# Patient Record
Sex: FEMALE | Race: WHITE | NOT HISPANIC OR LATINO | Employment: OTHER | URBAN - METROPOLITAN AREA
[De-identification: names, ages, dates, MRNs, and addresses within clinical notes are randomized per-mention and may not be internally consistent; named-entity substitution may affect disease eponyms.]

---

## 2020-09-18 ENCOUNTER — OFFICE VISIT (OUTPATIENT)
Dept: NEUROSURGERY | Facility: CLINIC | Age: 70
End: 2020-09-18

## 2020-09-18 ENCOUNTER — PREP FOR SURGERY (OUTPATIENT)
Dept: OTHER | Facility: HOSPITAL | Age: 70
End: 2020-09-18

## 2020-09-18 VITALS — BODY MASS INDEX: 24.99 KG/M2 | HEIGHT: 65 IN | TEMPERATURE: 97.5 F | WEIGHT: 150 LBS

## 2020-09-18 DIAGNOSIS — G89.29 CHRONIC BILATERAL LOW BACK PAIN WITH LEFT-SIDED SCIATICA: Primary | ICD-10-CM

## 2020-09-18 DIAGNOSIS — M51.26 LUMBAR DISC HERNIATION: Primary | ICD-10-CM

## 2020-09-18 DIAGNOSIS — M54.42 CHRONIC BILATERAL LOW BACK PAIN WITH LEFT-SIDED SCIATICA: Primary | ICD-10-CM

## 2020-09-18 DIAGNOSIS — M51.26 LUMBAR DISC HERNIATION: ICD-10-CM

## 2020-09-18 PROCEDURE — 99203 OFFICE O/P NEW LOW 30 MIN: CPT | Performed by: NEUROLOGICAL SURGERY

## 2020-09-18 RX ORDER — FAMOTIDINE 20 MG/1
20 TABLET, FILM COATED ORAL
Status: CANCELLED | OUTPATIENT
Start: 2020-09-18

## 2020-09-18 RX ORDER — ACETAMINOPHEN AND CODEINE PHOSPHATE 300; 30 MG/1; MG/1
TABLET ORAL
COMMUNITY
Start: 2020-09-11 | End: 2020-10-05

## 2020-09-18 RX ORDER — HYDROCHLOROTHIAZIDE 12.5 MG/1
12.5 TABLET ORAL DAILY
COMMUNITY
Start: 2020-08-06

## 2020-09-18 RX ORDER — GABAPENTIN 600 MG/1
600 TABLET ORAL
COMMUNITY
Start: 2020-08-28 | End: 2020-12-07

## 2020-09-18 RX ORDER — CEFAZOLIN SODIUM 2 G/100ML
2 INJECTION, SOLUTION INTRAVENOUS ONCE
Status: CANCELLED | OUTPATIENT
Start: 2020-09-18 | End: 2020-09-18

## 2020-09-18 RX ORDER — SODIUM CHLORIDE, SODIUM LACTATE, POTASSIUM CHLORIDE, CALCIUM CHLORIDE 600; 310; 30; 20 MG/100ML; MG/100ML; MG/100ML; MG/100ML
9 INJECTION, SOLUTION INTRAVENOUS CONTINUOUS
Status: CANCELLED | OUTPATIENT
Start: 2020-09-18

## 2020-09-18 RX ORDER — LOSARTAN POTASSIUM 50 MG/1
50 TABLET ORAL 2 TIMES DAILY
COMMUNITY
Start: 2020-08-06

## 2020-09-18 RX ORDER — IBUPROFEN 800 MG/1
800 TABLET ORAL ONCE
Status: CANCELLED | OUTPATIENT
Start: 2020-09-18 | End: 2020-09-18

## 2020-09-18 RX ORDER — ACETAMINOPHEN 325 MG/1
650 TABLET ORAL ONCE
Status: CANCELLED | OUTPATIENT
Start: 2020-09-18 | End: 2020-09-18

## 2020-09-18 RX ORDER — CHLORHEXIDINE GLUCONATE 4 G/100ML
SOLUTION TOPICAL
Qty: 120 ML | Refills: 0 | OUTPATIENT
Start: 2020-09-18 | End: 2020-12-07

## 2020-09-18 RX ORDER — HYDROCODONE BITARTRATE AND ACETAMINOPHEN 7.5; 325 MG/1; MG/1
1 TABLET ORAL ONCE
Status: CANCELLED | OUTPATIENT
Start: 2020-09-18 | End: 2020-09-18

## 2020-09-18 NOTE — PROGRESS NOTES
NAME: JOHANN ALLEN   DOS: 2020  : 1950  PCP: Cortney Long MD    Chief Complaint:    Chief Complaint   Patient presents with   • Back Pain   • Leg Pain       History of Present Illness:  70 y.o. female   Is a 70-year-old female who is from [place] who owns an equine business and is very healthy and active woman she is accompanied by her  who Dr. Slaughter had done spinal fusion surgery on    She presents with an insidious onset of about 6 to 8 weeks of buttock hip and left leg pain seems reminiscent of polyradiculopathy perhaps L3 or L4 she denies preexistent symptoms of neurogenic claudication she has had 2 lumbar epidural steroid blocks with minimal relief and is had a market reduction in her quality life she denies bowel bladder continence issues she does have occasional arthritic symptoms in her hip she is here for evaluation    PMHX  Allergies:  Allergies   Allergen Reactions   • Sulfa Antibiotics Unknown - Low Severity     Medications    Current Outpatient Medications:   •  acetaminophen-codeine (TYLENOL #3) 300-30 MG per tablet, TK 1 T PO Q 6 H PRN FOR PAIN FOLLOWING PROCEDURE, Disp: , Rfl:   •  gabapentin (NEURONTIN) 600 MG tablet, TK 1 T PO Q 4 H, Disp: , Rfl:   •  hydroCHLOROthiazide (HYDRODIURIL) 12.5 MG tablet, Take 12.5 mg by mouth Daily., Disp: , Rfl:   •  losartan (COZAAR) 50 MG tablet, Take 50 mg by mouth 2 (Two) Times a Day., Disp: , Rfl:   Past Medical History:  Past Medical History:   Diagnosis Date   • Hypertension      Past Surgical History:  Past Surgical History:   Procedure Laterality Date   • HYSTERECTOMY       Social Hx:  Social History     Tobacco Use   • Smoking status: Never Smoker   • Smokeless tobacco: Never Used   Substance Use Topics   • Alcohol use: Never     Frequency: Never   • Drug use: Never     Family Hx:  Family History   Problem Relation Age of Onset   • Heart disease Mother    • Stroke Father      Review of Systems:        Review of Systems    Musculoskeletal: Positive for back pain.   All other systems reviewed and are negative.  I have reviewed this note template and all pertinent parts of the review of systems social, family history, surgical history and medication list        Physical Examination:  Vitals:    09/18/20 1610   Temp: 97.5 °F (36.4 °C)      General Appearance:   Well developed, well nourished, well groomed, alert, and cooperative.  Neurological examination:  Neurologic Exam  Vital signs were reviewed and documented in the chart  Patient appeared in good neurologic function with normal comprehension fluent speech  Mood and affect are normal  Sense of smell deferred    Pupils symmetric equally reactive funduscopic exam not visualized   Visual fields intact to confrontation  Extraocular movements intact  Face motor function is symmetric  Facial sensations normal  Hearing intact to finger rub hearing intact to finger rub  Tongue is midline  Palate symmetric  Swallowing normal  Shoulder shrug normal    Muscle bulk and tone normal  5 out of 5 strength no motor drift  Gait normal intact antalgic gait  Negative Romberg  No clonus long tract signs or myelopathy    Reflexes symmetric trace throughout if not absent at the knees bilaterally I could elicit a right but not a left knee reflex  No edema noted and extremities skin appears normal    Straight leg raise sign absent on the right positive on the left  No signs of intrinsic hip dysfunction bilateral subtle  Back is without any lesions or abnormality  Feet are warm and well perfused        Review of Imaging/DATA:  I reviewed a MRI of her lumbar spine it shows pretty impressive lumbar degenerative disc disease at L4-5 L3-4 and multiple other levels she does have an intraforaminal left L3-4 disc herniation  Diagnoses/Plan:    Ms. Pedraza is a 70 y.o. female   Left-sided subacute L4 radiculopathy inferiorly extruded L3-4 disc multilevel degenerative disc disease talked with her about the  complexities of her lumbar spinal problem but more importantly about the long-term outcome regarding surgical and nonsurgical management I explained that there are equivalent strategies and most studies over the first 2 to 5 years however surgery expedite early recovery with no change in long-term outcome I explained the risk benefits of surgical management I think is reasonable she has a decreased left knee reflex and what her  discusses as intractable pain in the left hip and L4 distribution I showed her films and explained what surgical intervention looks like as well as the complication rate    Plan for left-sided L3-4 disc herniation removal

## 2020-09-21 PROBLEM — M51.26 LUMBAR DISC HERNIATION: Status: ACTIVE | Noted: 2020-09-21

## 2020-09-25 ENCOUNTER — APPOINTMENT (OUTPATIENT)
Dept: PREADMISSION TESTING | Facility: HOSPITAL | Age: 70
End: 2020-09-25

## 2020-09-27 ENCOUNTER — APPOINTMENT (OUTPATIENT)
Dept: PREADMISSION TESTING | Facility: HOSPITAL | Age: 70
End: 2020-09-27

## 2020-10-05 ENCOUNTER — PRE-ADMISSION TESTING (OUTPATIENT)
Dept: PREADMISSION TESTING | Facility: HOSPITAL | Age: 70
End: 2020-10-05

## 2020-10-05 ENCOUNTER — APPOINTMENT (OUTPATIENT)
Dept: PREADMISSION TESTING | Facility: HOSPITAL | Age: 70
End: 2020-10-05

## 2020-10-05 VITALS — BODY MASS INDEX: 24.99 KG/M2 | WEIGHT: 150 LBS | HEIGHT: 65 IN

## 2020-10-05 DIAGNOSIS — M51.26 LUMBAR DISC HERNIATION: ICD-10-CM

## 2020-10-05 LAB
ANION GAP SERPL CALCULATED.3IONS-SCNC: 9 MMOL/L (ref 5–15)
BUN SERPL-MCNC: 14 MG/DL (ref 8–23)
BUN/CREAT SERPL: 24.1 (ref 7–25)
CALCIUM SPEC-SCNC: 9.8 MG/DL (ref 8.6–10.5)
CHLORIDE SERPL-SCNC: 95 MMOL/L (ref 98–107)
CO2 SERPL-SCNC: 28 MMOL/L (ref 22–29)
CREAT SERPL-MCNC: 0.58 MG/DL (ref 0.57–1)
DEPRECATED RDW RBC AUTO: 39.4 FL (ref 37–54)
ERYTHROCYTE [DISTWIDTH] IN BLOOD BY AUTOMATED COUNT: 11.6 % (ref 12.3–15.4)
GFR SERPL CREATININE-BSD FRML MDRD: 102 ML/MIN/1.73
GLUCOSE SERPL-MCNC: 127 MG/DL (ref 65–99)
HCT VFR BLD AUTO: 41.1 % (ref 34–46.6)
HGB BLD-MCNC: 13.6 G/DL (ref 12–15.9)
MCH RBC QN AUTO: 30.7 PG (ref 26.6–33)
MCHC RBC AUTO-ENTMCNC: 33.1 G/DL (ref 31.5–35.7)
MCV RBC AUTO: 92.8 FL (ref 79–97)
MRSA DNA SPEC QL NAA+PROBE: NEGATIVE
PLATELET # BLD AUTO: 291 10*3/MM3 (ref 140–450)
PMV BLD AUTO: 9.5 FL (ref 6–12)
POTASSIUM SERPL-SCNC: 4.2 MMOL/L (ref 3.5–5.2)
RBC # BLD AUTO: 4.43 10*6/MM3 (ref 3.77–5.28)
SODIUM SERPL-SCNC: 132 MMOL/L (ref 136–145)
WBC # BLD AUTO: 6.27 10*3/MM3 (ref 3.4–10.8)

## 2020-10-05 PROCEDURE — 85027 COMPLETE CBC AUTOMATED: CPT | Performed by: NEUROLOGICAL SURGERY

## 2020-10-05 PROCEDURE — U0004 COV-19 TEST NON-CDC HGH THRU: HCPCS

## 2020-10-05 PROCEDURE — 87641 MR-STAPH DNA AMP PROBE: CPT | Performed by: NEUROLOGICAL SURGERY

## 2020-10-05 PROCEDURE — 80048 BASIC METABOLIC PNL TOTAL CA: CPT | Performed by: NEUROLOGICAL SURGERY

## 2020-10-05 PROCEDURE — 93005 ELECTROCARDIOGRAM TRACING: CPT

## 2020-10-05 PROCEDURE — C9803 HOPD COVID-19 SPEC COLLECT: HCPCS

## 2020-10-05 PROCEDURE — 93010 ELECTROCARDIOGRAM REPORT: CPT | Performed by: INTERNAL MEDICINE

## 2020-10-05 PROCEDURE — 36415 COLL VENOUS BLD VENIPUNCTURE: CPT

## 2020-10-05 RX ORDER — DIPHENHYDRAMINE HCL 50 MG
25 CAPSULE ORAL NIGHTLY PRN
COMMUNITY

## 2020-10-05 RX ORDER — BISACODYL 5 MG/1
5 TABLET, DELAYED RELEASE ORAL NIGHTLY PRN
COMMUNITY
End: 2020-12-07

## 2020-10-05 RX ORDER — ASPIRIN 325 MG
325 TABLET ORAL DAILY
COMMUNITY
End: 2020-12-07

## 2020-10-05 RX ORDER — DOCUSATE SODIUM 250 MG
500 CAPSULE ORAL DAILY
COMMUNITY

## 2020-10-05 RX ORDER — ACETAMINOPHEN AND CODEINE PHOSPHATE 60; 300 MG/1; MG/1
1 TABLET ORAL EVERY 4 HOURS PRN
COMMUNITY
End: 2020-12-07

## 2020-10-06 LAB — SARS-COV-2 RNA RESP QL NAA+PROBE: NOT DETECTED

## 2020-10-07 ENCOUNTER — APPOINTMENT (OUTPATIENT)
Dept: GENERAL RADIOLOGY | Facility: HOSPITAL | Age: 70
End: 2020-10-07

## 2020-10-07 ENCOUNTER — ANESTHESIA EVENT (OUTPATIENT)
Dept: PERIOP | Facility: HOSPITAL | Age: 70
End: 2020-10-07

## 2020-10-07 ENCOUNTER — ANESTHESIA (OUTPATIENT)
Dept: PERIOP | Facility: HOSPITAL | Age: 70
End: 2020-10-07

## 2020-10-07 ENCOUNTER — HOSPITAL ENCOUNTER (OUTPATIENT)
Facility: HOSPITAL | Age: 70
Setting detail: HOSPITAL OUTPATIENT SURGERY
Discharge: HOME | End: 2020-10-07
Attending: NEUROLOGICAL SURGERY | Admitting: NEUROLOGICAL SURGERY

## 2020-10-07 VITALS
HEART RATE: 92 BPM | OXYGEN SATURATION: 100 % | HEIGHT: 65 IN | WEIGHT: 150 LBS | TEMPERATURE: 97.4 F | DIASTOLIC BLOOD PRESSURE: 89 MMHG | SYSTOLIC BLOOD PRESSURE: 152 MMHG | BODY MASS INDEX: 24.99 KG/M2 | RESPIRATION RATE: 16 BRPM

## 2020-10-07 DIAGNOSIS — M51.26 LUMBAR DISC HERNIATION: ICD-10-CM

## 2020-10-07 PROCEDURE — 25010000002 FENTANYL CITRATE (PF) 100 MCG/2ML SOLUTION: Performed by: NURSE ANESTHETIST, CERTIFIED REGISTERED

## 2020-10-07 PROCEDURE — 25010000002 NEOSTIGMINE 10 MG/10ML SOLUTION: Performed by: NURSE ANESTHETIST, CERTIFIED REGISTERED

## 2020-10-07 PROCEDURE — 63030 LAMOT DCMPRN NRV RT 1 LMBR: CPT | Performed by: PHYSICIAN ASSISTANT

## 2020-10-07 PROCEDURE — 25010000002 PROPOFOL 10 MG/ML EMULSION: Performed by: NURSE ANESTHETIST, CERTIFIED REGISTERED

## 2020-10-07 PROCEDURE — 25010000002 ONDANSETRON PER 1 MG: Performed by: NURSE ANESTHETIST, CERTIFIED REGISTERED

## 2020-10-07 PROCEDURE — 25010000002 DEXAMETHASONE PER 1 MG: Performed by: NURSE ANESTHETIST, CERTIFIED REGISTERED

## 2020-10-07 PROCEDURE — 76000 FLUOROSCOPY <1 HR PHYS/QHP: CPT

## 2020-10-07 PROCEDURE — 63030 LAMOT DCMPRN NRV RT 1 LMBR: CPT | Performed by: NEUROLOGICAL SURGERY

## 2020-10-07 PROCEDURE — 25010000003 CEFAZOLIN IN DEXTROSE 2-4 GM/100ML-% SOLUTION: Performed by: NEUROLOGICAL SURGERY

## 2020-10-07 DEVICE — HEMOST ABS SURGIFOAM SZ100 8X12 10MM: Type: IMPLANTABLE DEVICE | Site: SPINE LUMBAR | Status: FUNCTIONAL

## 2020-10-07 DEVICE — FLOSEAL HEMOSTATIC MATRIX, 10ML
Type: IMPLANTABLE DEVICE | Site: SPINE LUMBAR | Status: FUNCTIONAL
Brand: FLOSEAL HEMOSTATIC MATRIX

## 2020-10-07 RX ORDER — SODIUM CHLORIDE, SODIUM LACTATE, POTASSIUM CHLORIDE, CALCIUM CHLORIDE 600; 310; 30; 20 MG/100ML; MG/100ML; MG/100ML; MG/100ML
9 INJECTION, SOLUTION INTRAVENOUS CONTINUOUS
Status: DISCONTINUED | OUTPATIENT
Start: 2020-10-07 | End: 2020-10-07 | Stop reason: HOSPADM

## 2020-10-07 RX ORDER — LIDOCAINE HYDROCHLORIDE 10 MG/ML
0.5 INJECTION, SOLUTION EPIDURAL; INFILTRATION; INTRACAUDAL; PERINEURAL ONCE AS NEEDED
Status: DISCONTINUED | OUTPATIENT
Start: 2020-10-07 | End: 2020-10-07 | Stop reason: HOSPADM

## 2020-10-07 RX ORDER — NEOSTIGMINE METHYLSULFATE 1 MG/ML
INJECTION, SOLUTION INTRAVENOUS AS NEEDED
Status: DISCONTINUED | OUTPATIENT
Start: 2020-10-07 | End: 2020-10-07 | Stop reason: SURG

## 2020-10-07 RX ORDER — FENTANYL CITRATE 50 UG/ML
50 INJECTION, SOLUTION INTRAMUSCULAR; INTRAVENOUS
Status: DISCONTINUED | OUTPATIENT
Start: 2020-10-07 | End: 2020-10-07 | Stop reason: HOSPADM

## 2020-10-07 RX ORDER — HYDROMORPHONE HYDROCHLORIDE 1 MG/ML
0.5 INJECTION, SOLUTION INTRAMUSCULAR; INTRAVENOUS; SUBCUTANEOUS
Status: DISCONTINUED | OUTPATIENT
Start: 2020-10-07 | End: 2020-10-07 | Stop reason: HOSPADM

## 2020-10-07 RX ORDER — FAMOTIDINE 20 MG/1
20 TABLET, FILM COATED ORAL ONCE
Status: CANCELLED | OUTPATIENT
Start: 2020-10-07 | End: 2020-10-07

## 2020-10-07 RX ORDER — ACETAMINOPHEN 325 MG/1
650 TABLET ORAL ONCE
Status: COMPLETED | OUTPATIENT
Start: 2020-10-07 | End: 2020-10-07

## 2020-10-07 RX ORDER — GLYCOPYRROLATE 0.2 MG/ML
INJECTION INTRAMUSCULAR; INTRAVENOUS AS NEEDED
Status: DISCONTINUED | OUTPATIENT
Start: 2020-10-07 | End: 2020-10-07 | Stop reason: SURG

## 2020-10-07 RX ORDER — HYDROCODONE BITARTRATE AND ACETAMINOPHEN 5; 325 MG/1; MG/1
1-2 TABLET ORAL EVERY 4 HOURS PRN
Qty: 30 TABLET | Refills: 0 | Status: SHIPPED | OUTPATIENT
Start: 2020-10-07 | End: 2020-10-26

## 2020-10-07 RX ORDER — DEXAMETHASONE SODIUM PHOSPHATE 4 MG/ML
INJECTION, SOLUTION INTRA-ARTICULAR; INTRALESIONAL; INTRAMUSCULAR; INTRAVENOUS; SOFT TISSUE AS NEEDED
Status: DISCONTINUED | OUTPATIENT
Start: 2020-10-07 | End: 2020-10-07 | Stop reason: SURG

## 2020-10-07 RX ORDER — HYDROCODONE BITARTRATE AND ACETAMINOPHEN 7.5; 325 MG/1; MG/1
1 TABLET ORAL ONCE
Status: COMPLETED | OUTPATIENT
Start: 2020-10-07 | End: 2020-10-07

## 2020-10-07 RX ORDER — ONDANSETRON 2 MG/ML
4 INJECTION INTRAMUSCULAR; INTRAVENOUS ONCE AS NEEDED
Status: DISCONTINUED | OUTPATIENT
Start: 2020-10-07 | End: 2020-10-07 | Stop reason: HOSPADM

## 2020-10-07 RX ORDER — FAMOTIDINE 20 MG/1
20 TABLET, FILM COATED ORAL
Status: COMPLETED | OUTPATIENT
Start: 2020-10-07 | End: 2020-10-07

## 2020-10-07 RX ORDER — ONDANSETRON 2 MG/ML
INJECTION INTRAMUSCULAR; INTRAVENOUS AS NEEDED
Status: DISCONTINUED | OUTPATIENT
Start: 2020-10-07 | End: 2020-10-07 | Stop reason: SURG

## 2020-10-07 RX ORDER — ROCURONIUM BROMIDE 10 MG/ML
INJECTION, SOLUTION INTRAVENOUS AS NEEDED
Status: DISCONTINUED | OUTPATIENT
Start: 2020-10-07 | End: 2020-10-07 | Stop reason: SURG

## 2020-10-07 RX ORDER — LIDOCAINE HYDROCHLORIDE 10 MG/ML
INJECTION, SOLUTION EPIDURAL; INFILTRATION; INTRACAUDAL; PERINEURAL AS NEEDED
Status: DISCONTINUED | OUTPATIENT
Start: 2020-10-07 | End: 2020-10-07 | Stop reason: SURG

## 2020-10-07 RX ORDER — SODIUM CHLORIDE 0.9 % (FLUSH) 0.9 %
10 SYRINGE (ML) INJECTION AS NEEDED
Status: DISCONTINUED | OUTPATIENT
Start: 2020-10-07 | End: 2020-10-07 | Stop reason: HOSPADM

## 2020-10-07 RX ORDER — HYDROCODONE BITARTRATE AND ACETAMINOPHEN 5; 325 MG/1; MG/1
1 TABLET ORAL ONCE AS NEEDED
Status: CANCELLED | OUTPATIENT
Start: 2020-10-07 | End: 2020-10-17

## 2020-10-07 RX ORDER — CEFAZOLIN SODIUM 2 G/100ML
2 INJECTION, SOLUTION INTRAVENOUS ONCE
Status: COMPLETED | OUTPATIENT
Start: 2020-10-07 | End: 2020-10-07

## 2020-10-07 RX ORDER — PROPOFOL 10 MG/ML
VIAL (ML) INTRAVENOUS AS NEEDED
Status: DISCONTINUED | OUTPATIENT
Start: 2020-10-07 | End: 2020-10-07 | Stop reason: SURG

## 2020-10-07 RX ORDER — BUPIVACAINE HYDROCHLORIDE 2.5 MG/ML
INJECTION, SOLUTION EPIDURAL; INFILTRATION; INTRACAUDAL AS NEEDED
Status: DISCONTINUED | OUTPATIENT
Start: 2020-10-07 | End: 2020-10-07 | Stop reason: HOSPADM

## 2020-10-07 RX ORDER — LIDOCAINE HYDROCHLORIDE AND EPINEPHRINE 5; 5 MG/ML; UG/ML
INJECTION, SOLUTION INFILTRATION; PERINEURAL AS NEEDED
Status: DISCONTINUED | OUTPATIENT
Start: 2020-10-07 | End: 2020-10-07 | Stop reason: HOSPADM

## 2020-10-07 RX ORDER — FAMOTIDINE 10 MG/ML
20 INJECTION, SOLUTION INTRAVENOUS ONCE
Status: CANCELLED | OUTPATIENT
Start: 2020-10-07 | End: 2020-10-07

## 2020-10-07 RX ORDER — MAGNESIUM HYDROXIDE 1200 MG/15ML
LIQUID ORAL AS NEEDED
Status: DISCONTINUED | OUTPATIENT
Start: 2020-10-07 | End: 2020-10-07 | Stop reason: HOSPADM

## 2020-10-07 RX ORDER — IBUPROFEN 800 MG/1
800 TABLET ORAL ONCE
Status: COMPLETED | OUTPATIENT
Start: 2020-10-07 | End: 2020-10-07

## 2020-10-07 RX ORDER — SODIUM CHLORIDE 0.9 % (FLUSH) 0.9 %
10 SYRINGE (ML) INJECTION EVERY 12 HOURS SCHEDULED
Status: DISCONTINUED | OUTPATIENT
Start: 2020-10-07 | End: 2020-10-07 | Stop reason: HOSPADM

## 2020-10-07 RX ADMIN — FAMOTIDINE 20 MG: 20 TABLET ORAL at 08:10

## 2020-10-07 RX ADMIN — CEFAZOLIN SODIUM 2 G: 2 INJECTION, SOLUTION INTRAVENOUS at 09:35

## 2020-10-07 RX ADMIN — SODIUM CHLORIDE, POTASSIUM CHLORIDE, SODIUM LACTATE AND CALCIUM CHLORIDE: 600; 310; 30; 20 INJECTION, SOLUTION INTRAVENOUS at 10:01

## 2020-10-07 RX ADMIN — ONDANSETRON 4 MG: 2 INJECTION INTRAMUSCULAR; INTRAVENOUS at 10:44

## 2020-10-07 RX ADMIN — ROCURONIUM BROMIDE 40 MG: 10 INJECTION INTRAVENOUS at 09:26

## 2020-10-07 RX ADMIN — ACETAMINOPHEN 650 MG: 500 TABLET ORAL at 08:10

## 2020-10-07 RX ADMIN — SODIUM CHLORIDE, POTASSIUM CHLORIDE, SODIUM LACTATE AND CALCIUM CHLORIDE 9 ML/HR: 600; 310; 30; 20 INJECTION, SOLUTION INTRAVENOUS at 08:10

## 2020-10-07 RX ADMIN — NEOSTIGMINE 3 MG: 1 INJECTION INTRAVENOUS at 10:44

## 2020-10-07 RX ADMIN — HYDROCODONE BITARTRATE AND ACETAMINOPHEN 1 TABLET: 7.5; 325 TABLET ORAL at 08:11

## 2020-10-07 RX ADMIN — IBUPROFEN 800 MG: 800 TABLET, FILM COATED ORAL at 08:11

## 2020-10-07 RX ADMIN — EPHEDRINE SULFATE 10 MG: 50 INJECTION INTRAMUSCULAR; INTRAVENOUS; SUBCUTANEOUS at 10:36

## 2020-10-07 RX ADMIN — DEXAMETHASONE SODIUM PHOSPHATE 8 MG: 4 INJECTION, SOLUTION INTRAMUSCULAR; INTRAVENOUS at 09:35

## 2020-10-07 RX ADMIN — LIDOCAINE HYDROCHLORIDE 50 MG: 10 INJECTION, SOLUTION EPIDURAL; INFILTRATION; INTRACAUDAL; PERINEURAL at 09:26

## 2020-10-07 RX ADMIN — FENTANYL CITRATE 50 MCG: 0.05 INJECTION, SOLUTION INTRAMUSCULAR; INTRAVENOUS at 12:08

## 2020-10-07 RX ADMIN — EPHEDRINE SULFATE 15 MG: 50 INJECTION INTRAMUSCULAR; INTRAVENOUS; SUBCUTANEOUS at 09:40

## 2020-10-07 RX ADMIN — PROPOFOL 150 MG: 10 INJECTION, EMULSION INTRAVENOUS at 09:26

## 2020-10-07 RX ADMIN — GLYCOPYRROLATE 0.4 MG: 0.2 INJECTION INTRAMUSCULAR; INTRAVENOUS at 10:44

## 2020-10-07 RX ADMIN — EPHEDRINE SULFATE 5 MG: 50 INJECTION INTRAMUSCULAR; INTRAVENOUS; SUBCUTANEOUS at 09:58

## 2020-10-07 NOTE — ANESTHESIA PREPROCEDURE EVALUATION
Anesthesia Evaluation                  Airway   Mallampati: I  TM distance: >3 FB  Neck ROM: full  No difficulty expected  Dental - normal exam     Pulmonary - normal exam   Cardiovascular - normal exam    (+) hypertension,       Neuro/Psych  GI/Hepatic/Renal/Endo      Musculoskeletal     (+) back pain,   Abdominal  - normal exam    Bowel sounds: normal.   Substance History      OB/GYN          Other                        Anesthesia Plan    ASA 2     general     intravenous induction     Anesthetic plan, all risks, benefits, and alternatives have been provided, discussed and informed consent has been obtained with: patient.    Plan discussed with CRNA.

## 2020-10-07 NOTE — ANESTHESIA PROCEDURE NOTES
Airway  Urgency: elective    Date/Time: 10/7/2020 9:30 AM  Airway not difficult    General Information and Staff    Patient location during procedure: OR  CRNA: Adrian Castro CRNA    Indications and Patient Condition  Indications for airway management: airway protection    Preoxygenated: yes  MILS not maintained throughout  Mask difficulty assessment: 2 - vent by mask + OA or adjuvant +/- NMBA    Final Airway Details  Final airway type: endotracheal airway      Successful airway: ETT  Cuffed: yes   Successful intubation technique: direct laryngoscopy  Endotracheal tube insertion site: oral  Blade: Holman  Blade size: 3  ETT size (mm): 7.0  Cormack-Lehane Classification: grade IIa - partial view of glottis  Placement verified by: chest auscultation and capnometry   Cuff volume (mL): 5  Measured from: lips  ETT/EBT  to lips (cm): 21  Number of attempts at approach: 2  Assessment: lips, teeth, and gum same as pre-op and atraumatic intubation    Additional Comments  Negative epigastric sounds, Breath sound equal bilaterally with symmetric chest rise and fall

## 2020-10-07 NOTE — INTERVAL H&P NOTE
"Pre-Op H&P (See Recent Office Note Attached for Full H&P)    Chief complaint: Left buttock, hip, and leg pain    HPI:      Patient is a 70 y.o. female who presents with an insidious onset of about 6 to 8 weeks of buttock hip and left leg pain seemingly reminiscent of polyradiculopathy perhaps L3 or L4. She denies pre-existent symptoms of neurogenic claudication. She has had 2 lumbar epidural steroid blocks with minimal relief. She has had a marked reduction in her quality life. She denies bowel/bladder continence issues. She does have occasional arthritic symptoms in her hip. MRI of her lumbar spine shows pretty impressive lumbar degenerative disc disease at L4-5 L3-4 and multiple other levels. She does have an intraforaminal left L3-4 disc herniation. Conservative treatment has failed to provide significant relief. Surgical intervention is recommended and she is agreeable. She is here today for a left lumbar microdiscectomy L3-L4.    Review of Systems:  General ROS:  no fever, chills, rashes.  No change since last office visit.  No recent sick exposure  Cardiovascular ROS: no chest pain or dyspnea on exertion; +HTN  Respiratory ROS: no cough, shortness of breath, or wheezing      Meds:    No current facility-administered medications on file prior to encounter.      Current Outpatient Medications on File Prior to Encounter   Medication Sig Dispense Refill   • chlorhexidine (HIBICLENS) 4 % external liquid Shower each day with solution for 5 days beginning 5 days before surgery. 120 mL 0   • gabapentin (NEURONTIN) 600 MG tablet Take 600 mg by mouth Every 4 (Four) Hours.     • hydroCHLOROthiazide (HYDRODIURIL) 12.5 MG tablet Take 12.5 mg by mouth Daily.     • losartan (COZAAR) 50 MG tablet Take 50 mg by mouth 2 (Two) Times a Day.         Vital Signs:  /84 (BP Location: Right arm, Patient Position: Lying)   Pulse 84   Temp 98 °F (36.7 °C) (Temporal)   Resp 18   Ht 165.1 cm (65\")   Wt 68 kg (150 lb)   SpO2 " 93%   BMI 24.96 kg/m²     Physical Exam:    CV:  S1S2 regular rate and rhythm, no murmur               Resp:  Clear to auscultation; respirations regular, even and unlabored    Results Review:     Lab Results   Component Value Date    WBC 6.27 10/05/2020    HGB 13.6 10/05/2020    HCT 41.1 10/05/2020    MCV 92.8 10/05/2020     10/05/2020    GLUCOSE 127 (H) 10/05/2020    BUN 14 10/05/2020    CREATININE 0.58 10/05/2020    EGFRIFNONA 102 10/05/2020     (L) 10/05/2020    K 4.2 10/05/2020    CL 95 (L) 10/05/2020    CO2 28.0 10/05/2020    CALCIUM 9.8 10/05/2020        I reviewed the patient's new clinical results.     10/5/20 COVID-19: negative   10/5/20 ECG: reviewed, NSR    Cancer Staging (if applicable)  Cancer Patient: __ yes _X_no __unknown; If yes, clinical stage T:__ N:__M:__, stage group or __N/A    Assessment:   1. Left-sided, subacute L4 radiculopathy   2. Inferiorly extruded L3-L4 disc   3. Multilevel degenerative disc disease   4. Anesthesia aware of elevated BP. Will continue to monitor closely.    Plan: Left lumbar microdiscectomy L3-L4      Loraine Caballero, APRN  10/7/2020   08:07 EDT

## 2020-10-07 NOTE — ANESTHESIA POSTPROCEDURE EVALUATION
Patient: Shweta Pedraza    Procedure Summary     Date: 10/07/20 Room / Location:  [facility]  [facility]    Anesthesia Start: 0921 Anesthesia Stop:     Procedure: lumbar MICROdiscectomy left L3-4 (Left Spine Lumbar) Diagnosis:       Lumbar disc herniation      (Lumbar disc herniation [M51.26])    Surgeon: Fortino Abraham MD Provider: Rudi Lr MD    Anesthesia Type: general ASA Status: 2          Anesthesia Type: general    Vitals  No vitals data found for the desired time range.          Post Anesthesia Care and Evaluation    Patient location during evaluation: PACU  Patient participation: complete - patient participated  Level of consciousness: awake  Pain score: 0  Pain management: adequate  Airway patency: patent  Anesthetic complications: No anesthetic complications  PONV Status: none  Cardiovascular status: acceptable and stable  Respiratory status: nasal cannula, unassisted, acceptable and spontaneous ventilation  Hydration status: acceptable

## 2020-10-07 NOTE — OP NOTE
Operation note      Preoperative diagnosis left-sided central and left inferiorly extruded L3-4 disc fragment    Postoperative diagnosis same    Procedures performed left-sided left-sided L3-4 lamina foraminotomies, excision of inferiorly extruded disc      Surgeon: Fortino Abraham MD     Assistants: Zaire CAMACHO    Anesthesia: Normal endotracheal anesthesia    ASA Class: 2    Findings hard disc calcification with  cartilaginous disc endplate and multiple disc fragments found into the exiting foramina with evidence of old and new herniation large central mound of calcific disc herniation  Complications none    10 cc blood loss microscope used    Procedure in detail after formal written consent was obtained the patient was taken to the operating room endotracheally intubated given preoperative antimicrobial prophylaxis they were prepped and draped in the usual sterile manner all bony prominences and genitalia were padded to prevent neurologic injury.    At this point in time the patient was given local anesthesia at the operative level fluoroscopic guidance confirmed as left L3-4 the correct level and tubular dilation system was placed on the lamina facet complex to ensure adequate exposure.  Severe lateral recess stenosis was noted, foraminotomy was performed    At this point time the microscope was used to visualize the exposure a hemilaminotomy and partial medial facetectomy was performed the yellow ligament was then identified and removed the traversing nerve root was identified and the disc space was identified as well by gentle mobilization of the nerve root.  Multiple copious fragments had jailed the left exiting nerve root at L4,      Exploration of the annular tear demonstrated, a large ligamentous fragment as well as cartilaginous fragment were noted and adequate neural decompression was confirmed.  Lots of the scar tissue from prior disc rupture was noted adherent to the area but a ball  probe was easily passed the central disc was noted to be very calcified and stable    At the resolution the case meticulous hemostasis was maintained and in the incision was closed in layers I was present personally performed the entirety of the procedure  My physician's assistant was present and personally scrubbed the entirety of the procedure, they assisted suctioning, retraction, approach, and closure of the case

## 2020-10-26 ENCOUNTER — OFFICE VISIT (OUTPATIENT)
Dept: NEUROSURGERY | Facility: CLINIC | Age: 70
End: 2020-10-26

## 2020-10-26 VITALS
WEIGHT: 145.6 LBS | DIASTOLIC BLOOD PRESSURE: 70 MMHG | BODY MASS INDEX: 24.26 KG/M2 | HEIGHT: 65 IN | SYSTOLIC BLOOD PRESSURE: 142 MMHG | TEMPERATURE: 96.9 F

## 2020-10-26 DIAGNOSIS — M51.26 LUMBAR DISC HERNIATION: Primary | ICD-10-CM

## 2020-10-26 PROCEDURE — 99024 POSTOP FOLLOW-UP VISIT: CPT | Performed by: PHYSICIAN ASSISTANT

## 2020-10-26 NOTE — PROGRESS NOTES
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    Patient states that she is much better but still has a little bit of pickups of pain in her left hip and left anterior calf but this quickly remits.  I have discussed with the patient that this is a normal finding.  Patient takes only Mobic at this time and 1 dose of gabapentin at night.    Patient's incision is clean dry no signs of infection bleeding erythema.    Currently the patient will attend physical therapy hopefully to further expedite her recovery and work on regaining more strength.    Review of Systems   Constitutional: Negative for activity change, appetite change, chills, diaphoresis, fatigue, fever and unexpected weight change.   HENT: Negative for congestion, dental problem, drooling, ear discharge, ear pain, facial swelling, hearing loss, mouth sores, nosebleeds, postnasal drip, rhinorrhea, sinus pressure, sinus pain, sneezing, sore throat, tinnitus, trouble swallowing and voice change.    Eyes: Negative for photophobia, pain, discharge, redness, itching and visual disturbance.   Respiratory: Negative for apnea, cough, choking, chest tightness, shortness of breath, wheezing and stridor.    Cardiovascular: Negative for chest pain, palpitations and leg swelling.   Gastrointestinal: Negative for abdominal distention, abdominal pain, anal bleeding, blood in stool, constipation, diarrhea, nausea, rectal pain and vomiting.   Endocrine: Negative for cold intolerance, heat intolerance, polydipsia, polyphagia and polyuria.   Genitourinary: Negative for decreased urine volume, difficulty urinating, dyspareunia, dysuria, enuresis, flank pain, frequency, genital sores, hematuria, menstrual problem, pelvic pain, urgency, vaginal bleeding, vaginal  "discharge and vaginal pain.   Musculoskeletal: Positive for back pain. Negative for arthralgias, gait problem, joint swelling, myalgias, neck pain and neck stiffness.   Skin: Negative for color change, pallor, rash and wound.   Allergic/Immunologic: Negative for environmental allergies, food allergies and immunocompromised state.   Neurological: Negative for dizziness, tremors, seizures, syncope, facial asymmetry, speech difficulty, weakness, light-headedness, numbness and headaches.   Hematological: Negative for adenopathy. Does not bruise/bleed easily.   Psychiatric/Behavioral: Negative for agitation, behavioral problems, confusion, decreased concentration, dysphoric mood, hallucinations, self-injury, sleep disturbance and suicidal ideas. The patient is not nervous/anxious and is not hyperactive.    All other systems reviewed and are negative.      Past Medical History:     Past Medical History:   Diagnosis Date   • Acute back pain with sciatica, left    • Back pain    • Constipation    • Hypertension        Family History:     Family History   Problem Relation Age of Onset   • Heart disease Mother    • Stroke Father        Social History:    reports that she has never smoked. She has never used smokeless tobacco. She reports that she does not drink alcohol or use drugs.   SMOKING STATUS: Non-smoker    Surgical History:     Past Surgical History:   Procedure Laterality Date   • HYSTERECTOMY     • LUMBAR DISCECTOMY Left 10/7/2020    Procedure: lumbar MICROdiscectomy left L3-4;  Surgeon: Fortino Abraham MD;  Location: [facility];  Service: Neurosurgery;  Laterality: Left;       Allergies:   Sulfa antibiotics    Physical Exam:    Vital Signs:/70 (BP Location: Right arm, Patient Position: Sitting, Cuff Size: Adult)   Temp 96.9 °F (36.1 °C)   Ht 165.1 cm (65\")   Wt 66 kg (145 lb 9.6 oz)   BMI 24.23 kg/m²    BMI: Body mass index is 24.23 kg/m².         Incision:   The incision is well-healed and well " approximated.  No signs of infection, bleeding, or erythema.    Musculoskeletal:                                            Dorsiflexion is 5/5 Bilaterally                    Plantarflexion is 5/5 bilaterally                    Hip Flexion 5/5 bilaterally.                        Neurologic:                                         The patient is alert and oriented by 3.                       Sensation is equal bilaterally with no deficit.                        Reflexes:  2+ throughout       Medical Decision Making    Data Review:   No new films reviewed at this visit    Diagnosis:   Left L3-4 disc herniation   Status post microdiscectomy    Treatment Options:   Patient is doing very well at this point.  Patient is pleased postsurgical outcome.  Incision is clean, dry.  No signs of infection, bleeding, or erythema.    The patient will follow-up in 6 weeks after completing physical therapy. The patient understands instructions and limitations for the best post-operative success.    It has been a pleasure providing neurosurgical care.    Zaire Lora PA-C    Patient's Body mass index is 24.23 kg/m². BMI is within normal parameters. No follow-up required..     Diagnosis Plan   1. Lumbar disc herniation  Ambulatory Referral to Physical Therapy Evaluate and treat (L l4-5 microdisc)

## 2020-12-07 ENCOUNTER — OFFICE VISIT (OUTPATIENT)
Dept: NEUROSURGERY | Facility: CLINIC | Age: 70
End: 2020-12-07

## 2020-12-07 VITALS
BODY MASS INDEX: 26.46 KG/M2 | WEIGHT: 158.8 LBS | SYSTOLIC BLOOD PRESSURE: 140 MMHG | TEMPERATURE: 97.6 F | DIASTOLIC BLOOD PRESSURE: 85 MMHG | HEIGHT: 65 IN

## 2020-12-07 DIAGNOSIS — M51.26 LUMBAR DISC HERNIATION: Primary | ICD-10-CM

## 2020-12-07 PROCEDURE — 99024 POSTOP FOLLOW-UP VISIT: CPT | Performed by: PHYSICIAN ASSISTANT

## 2020-12-07 NOTE — PATIENT INSTRUCTIONS
Obesity, Adult  Obesity is the condition of having too much total body fat. Being overweight or obese means that your weight is greater than what is considered healthy for your body size. Obesity is determined by a measurement called BMI. BMI is an estimate of body fat and is calculated from height and weight. For adults, a BMI of 30 or higher is considered obese.  Obesity can lead to other health concerns and major illnesses, including:  · Stroke.  · Coronary artery disease (CAD).  · Type 2 diabetes.  · Some types of cancer, including cancers of the colon, breast, uterus, and gallbladder.  · Osteoarthritis.  · High blood pressure (hypertension).  · High cholesterol.  · Sleep apnea.  · Gallbladder stones.  · Infertility problems.  What are the causes?  Common causes of this condition include:  · Eating daily meals that are high in calories, sugar, and fat.  · Being born with genes that may make you more likely to become obese.  · Having a medical condition that causes obesity, including:  ? Hypothyroidism.  ? Polycystic ovarian syndrome (PCOS).  ? Binge-eating disorder.  ? Cushing syndrome.  · Taking certain medicines, such as steroids, antidepressants, and seizure medicines.  · Not being physically active (sedentary lifestyle).  · Not getting enough sleep.  · Drinking high amounts of sugar-sweetened beverages, such as soft drinks.  What increases the risk?  The following factors may make you more likely to develop this condition:  · Having a family history of obesity.  · Being a woman of  descent.  · Being a man of  descent.  · Living in an area with limited access to:  ? [place], recreation centers, or sidewalks.  ? Healthy food choices, such as grocery stores and farmers' markets.  What are the signs or symptoms?  The main sign of this condition is having too much body fat.  How is this diagnosed?  This condition is diagnosed based on:  · Your BMI. If you are an adult with a BMI of 30 or  higher, you are considered obese.  · Your waist circumference. This measures the distance around your waistline.  · Your skinfold thickness. Your health care provider may gently pinch a fold of your skin and measure it.  You may have other tests to check for underlying conditions.  How is this treated?  Treatment for this condition often includes changing your lifestyle. Treatment may include some or all of the following:  · Dietary changes. This may include developing a healthy meal plan.  · Regular physical activity. This may include activity that causes your heart to beat faster (aerobic exercise) and strength training. Work with your health care provider to design an exercise program that works for you.  · Medicine to help you lose weight if you are unable to lose 1 pound a week after 6 weeks of healthy eating and more physical activity.  · Treating conditions that cause the obesity (underlying conditions).  · Surgery. Surgical options may include gastric banding and gastric bypass. Surgery may be done if:  ? Other treatments have not helped to improve your condition.  ? You have a BMI of 40 or higher.  ? You have life-threatening health problems related to obesity.  Follow these instructions at home:  Eating and drinking    · Follow recommendations from your health care provider about what you eat and drink. Your health care provider may advise you to:  ? Limit fast food, sweets, and processed snack foods.  ? Choose low-fat options, such as low-fat milk instead of whole milk.  ? Eat 5 or more servings of fruits or vegetables every day.  ? Eat at home more often. This gives you more control over what you eat.  ? Choose healthy foods when you eat out.  ? Learn to read food labels. This will help you understand how much food is considered 1 serving.  ? Learn what a healthy serving size is.  ? Keep low-fat snacks available.  ? Limit sugary drinks, such as soda, fruit juice, sweetened iced tea, and flavored  milk.  · Drink enough water to keep your urine pale yellow.  · Do not follow a fad diet. Fad diets can be unhealthy and even dangerous.  Physical activity  · Exercise regularly, as told by your health care provider.  ? Most adults should get up to 150 minutes of moderate-intensity exercise every week.  ? Ask your health care provider what types of exercise are safe for you and how often you should exercise.  · Warm up and stretch before being active.  · Cool down and stretch after being active.  · Rest between periods of activity.  Lifestyle  · Work with your health care provider and a dietitian to set a weight-loss goal that is healthy and reasonable for you.  · Limit your screen time.  · Find ways to reward yourself that do not involve food.  · Do not drink alcohol if:  ? Your health care provider tells you not to drink.  ? You are pregnant, may be pregnant, or are planning to become pregnant.  · If you drink alcohol:  ? Limit how much you use to:  § 0-1 drink a day for women.  § 0-2 drinks a day for men.  ? Be aware of how much alcohol is in your drink. In the [place] one drink equals one 12 oz bottle of beer (355 mL), one 5 oz glass of wine (148 mL), or one 1½ oz glass of hard liquor (44 mL).  General instructions  · Keep a weight-loss journal to keep track of the food you eat and how much exercise you get.  · Take over-the-counter and prescription medicines only as told by your health care provider.  · Take vitamins and supplements only as told by your health care provider.  · Consider joining a support group. Your health care provider may be able to recommend a support group.  · Keep all follow-up visits as told by your health care provider. This is important.  Contact a health care provider if:  · You are unable to meet your weight loss goal after 6 weeks of dietary and lifestyle changes.  Get help right away if you are having:  · Trouble breathing.  · Suicidal thoughts or behaviors.  Summary  · Obesity is the  condition of having too much total body fat.  · Being overweight or obese means that your weight is greater than what is considered healthy for your body size.  · Work with your health care provider and a dietitian to set a weight-loss goal that is healthy and reasonable for you.  · Exercise regularly, as told by your health care provider. Ask your health care provider what types of exercise are safe for you and how often you should exercise.  This information is not intended to replace advice given to you by your health care provider. Make sure you discuss any questions you have with your health care provider.  Document Revised: 08/22/2019 Document Reviewed: 08/22/2019  Elsevier Patient Education © 2020 [employer]

## 2020-12-07 NOTE — PROGRESS NOTES
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  Patient underwent a lumbar microdiscectomy at left L3-4 on 10/7/2020.  At last office visit patient was still having a little bit of issues with her pain but has since had it subsided and only has intermittent pains from her left knee down to the outside of her foot.  This is much more tolerable and she is doing fantastic.  Patient is very pleased with postsurgical outcome.    Incision is clean dry no sign infection bleeding erythema.  Patient currently having no issues with her spine    Review of Systems   Constitutional: Negative for activity change, appetite change, chills, diaphoresis, fatigue, fever and unexpected weight change.   HENT: Negative for congestion, dental problem, drooling, ear discharge, ear pain, facial swelling, hearing loss, mouth sores, nosebleeds, postnasal drip, rhinorrhea, sinus pressure, sinus pain, sneezing, sore throat, tinnitus, trouble swallowing and voice change.    Eyes: Negative for photophobia, pain, discharge, redness, itching and visual disturbance.   Respiratory: Negative for apnea, cough, choking, chest tightness, shortness of breath, wheezing and stridor.    Cardiovascular: Negative for chest pain, palpitations and leg swelling.   Gastrointestinal: Negative for abdominal distention, abdominal pain, anal bleeding, blood in stool, constipation, diarrhea, nausea, rectal pain and vomiting.   Endocrine: Negative for cold intolerance, heat intolerance, polydipsia, polyphagia and polyuria.   Genitourinary: Negative for decreased urine volume, difficulty urinating, dyspareunia, dysuria, enuresis, flank pain, frequency, genital sores, hematuria, menstrual problem, pelvic pain, urgency, vaginal bleeding, vaginal discharge and vaginal pain.  "  Musculoskeletal: Positive for back pain. Negative for arthralgias, gait problem, joint swelling, myalgias, neck pain and neck stiffness.   Skin: Negative for color change, pallor, rash and wound.   Allergic/Immunologic: Negative for environmental allergies, food allergies and immunocompromised state.   Neurological: Negative for dizziness, tremors, seizures, syncope, facial asymmetry, speech difficulty, weakness, light-headedness, numbness and headaches.   Hematological: Negative for adenopathy. Does not bruise/bleed easily.   Psychiatric/Behavioral: Negative for agitation, behavioral problems, confusion, decreased concentration, dysphoric mood, hallucinations, self-injury, sleep disturbance and suicidal ideas. The patient is not nervous/anxious and is not hyperactive.    All other systems reviewed and are negative.      Past Medical History:     Past Medical History:   Diagnosis Date   • Acute back pain with sciatica, left    • Back pain    • Constipation    • Hypertension        Family History:     Family History   Problem Relation Age of Onset   • Heart disease Mother    • Stroke Father        Social History:    reports that she has never smoked. She has never used smokeless tobacco. She reports that she does not drink alcohol or use drugs.   SMOKING STATUS: Non-smoker    Surgical History:     Past Surgical History:   Procedure Laterality Date   • HYSTERECTOMY     • LUMBAR DISCECTOMY Left 10/7/2020    Procedure: lumbar MICROdiscectomy left L3-4;  Surgeon: Fortino Abraham MD;  Location: [facility];  Service: Neurosurgery;  Laterality: Left;       Allergies:   Sulfa antibiotics    Physical Exam:    Vital Signs:/85 (BP Location: Left arm, Patient Position: Sitting, Cuff Size: Adult)   Temp 97.6 °F (36.4 °C) (Infrared)   Ht 165.1 cm (65\")   Wt 72 kg (158 lb 12.8 oz)   BMI 26.43 kg/m²    BMI: Body mass index is 26.43 kg/m².       Incision:   The incision is well-healed and well approximated.  No signs " of infection, bleeding, or erythema.    Musculoskeletal:                                            Dorsiflexion is 5/5 Bilaterally                    Plantarflexion is 5/5 bilaterally                    Hip Flexion 5/5 bilaterally.                        Neurologic:                                         The patient is alert and oriented by 3.                       Sensation is equal bilaterally with no deficit.                        Reflexes:  2+ throughout       Medical Decision Making    Data Review:   No new films reviewed at this visit    Diagnosis:   Left leg sciatica   Status post L3-4 microdiscectomy    Treatment Options:   At this point the patient is doing very well.  The patient is pleased with postsurgical outcome.  With the resolution of pain, I believe that it is adequate to see the patient back on an as-needed basis.  The patient has been educated on reasons that would necessitate a referral back to us in a more urgent manner.  The patient understands of his instructions and limitations for the best post-operative success.    It has been a pleasure providing neurosurgical care.    Zaire Lora PA-C    Patient's Body mass index is 26.43 kg/m². BMI is above normal parameters. Recommendations include: educational material.     Diagnosis Plan   1. Lumbar disc herniation

## (undated) DEVICE — GLV SURG BIOGEL LTX PF 8

## (undated) DEVICE — SUT VIC 0 UR6 27IN VCP603H

## (undated) DEVICE — CVR HNDL LIGHT RIGID

## (undated) DEVICE — HDRST INTUB GENTLETOUCH SLOT 7IN RT

## (undated) DEVICE — C-ARM: Brand: UNBRANDED

## (undated) DEVICE — ADHS LIQ MASTISOL 2/3ML

## (undated) DEVICE — DRSNG SURESITE123 4X4.8IN

## (undated) DEVICE — RUBBERBAND LF STRL PK/2

## (undated) DEVICE — TOOL T12MH25M LEGEND 12CM 2.5MM MH 2FLT: Brand: MIDAS REX ™

## (undated) DEVICE — ELECTRD BLD EDGE/INSUL1P 2.4X5.1MM STRL

## (undated) DEVICE — SYR CONTRL LUERLOK 10CC

## (undated) DEVICE — THE FLOSEAL MALLEABLE TIP AND TRIMMABLE TIP ARE INTENDED FOR DELIVERY OF FLOSEAL HEMOSTATIC MATRIX.: Brand: FLOSEAL SPECIAL APPLICATOR TIPS

## (undated) DEVICE — PK NEURO DISC 10

## (undated) DEVICE — ANTIBACTERIAL UNDYED BRAIDED (POLYGLACTIN 910), SYNTHETIC ABSORBABLE SUTURE: Brand: COATED VICRYL

## (undated) DEVICE — DRP MICROSCOPE 4 BINOCULAR CV 54X150IN

## (undated) DEVICE — PAD ARMBRD SURG CONVOL 7.5X20X2IN

## (undated) DEVICE — ELECTRD BLD EXT EDGE/INSUL 6IN

## (undated) DEVICE — 1010 S-DRAPE TOWEL DRAPE 10/BX: Brand: STERI-DRAPE™

## (undated) DEVICE — NEEDLE, QUINCKE 22GX3.5": Brand: MEDLINE INDUSTRIES, INC.

## (undated) DEVICE — 3M™ STERI-STRIP™ REINFORCED ADHESIVE SKIN CLOSURES, R1547, 1/2 IN X 4 IN (12 MM X 100 MM), 6 STRIPS/ENVELOPE: Brand: 3M™ STERI-STRIP™

## (undated) DEVICE — 3M™ STERI-DRAPE™ INSTRUMENT POUCH 1018: Brand: STERI-DRAPE™

## (undated) DEVICE — SPNG GZ WOVN 4X4IN 12PLY 10/BX STRL

## (undated) DEVICE — GLV SURG PREMIERPRO MIC LTX PF SZ8 BRN

## (undated) DEVICE — NDL HYPO ECLPS SFTY 25G 1 1/2IN

## (undated) DEVICE — STRAP POSTN KN/BDY FM 5X72IN DISP

## (undated) DEVICE — PAD GRND REM POLYHESIVE A/ DISP

## (undated) DEVICE — SCRB SURG BACTOSHIELD CHG 4PCT 4OZ

## (undated) DEVICE — 3M™ WARMING BLANKET, UPPER BODY, 10 PER CASE, 42268: Brand: BAIR HUGGER™

## (undated) DEVICE — Device